# Patient Record
Sex: MALE | Race: WHITE | NOT HISPANIC OR LATINO | URBAN - METROPOLITAN AREA
[De-identification: names, ages, dates, MRNs, and addresses within clinical notes are randomized per-mention and may not be internally consistent; named-entity substitution may affect disease eponyms.]

---

## 2022-01-21 ENCOUNTER — IMPORTED ENCOUNTER (OUTPATIENT)
Dept: URBAN - METROPOLITAN AREA CLINIC 31 | Facility: CLINIC | Age: 72
End: 2022-01-21

## 2022-01-21 PROBLEM — H02.831: Noted: 2022-01-21

## 2022-01-21 PROBLEM — H02.834: Noted: 2022-01-21

## 2022-01-21 PROBLEM — H35.3131: Noted: 2022-01-21

## 2022-01-21 PROBLEM — H35.373: Noted: 2022-01-21

## 2022-01-21 PROCEDURE — 99204 OFFICE O/P NEW MOD 45 MIN: CPT

## 2022-01-21 PROCEDURE — 92134 CPTRZ OPH DX IMG PST SGM RTA: CPT

## 2022-01-21 PROCEDURE — 92015 DETERMINE REFRACTIVE STATE: CPT

## 2022-01-21 PROCEDURE — 92250 FUNDUS PHOTOGRAPHY W/I&R: CPT

## 2022-01-21 NOTE — PATIENT DISCUSSION
1.  Dermatochalasis OU:    extreme hooding--did not discuss with pt-- Patient currently asymptomatic. Observe. 2. ARMD OU dry -   OS>OD--  OCT 1/21/22  259/265. Importance of smoking cessation blood pressure control and healthy diet were emphasized. In accordance with the AREDS study a good multivitamin containing EC and Zinc were recommened to be taken daily. Patient was instructed to self monitor their monocular vision (reading/[employer] Grid) at least weekly. Patient should immediately report any new onset of decreased vision or metamorphopsia. 3. Epiretinal Membrane OU - 4. Happy with OTC 5.   RTN 1 yr CE/OCT mac

## 2022-04-02 ASSESSMENT — VISUAL ACUITY
OS_SC: 20/200
OD_CC: 20/40
OD_SC: 20/200
OS_CC: 20/40

## 2022-04-02 ASSESSMENT — TONOMETRY
OD_IOP_MMHG: 14
OS_IOP_MMHG: 14